# Patient Record
Sex: FEMALE | Race: WHITE | NOT HISPANIC OR LATINO | URBAN - METROPOLITAN AREA
[De-identification: names, ages, dates, MRNs, and addresses within clinical notes are randomized per-mention and may not be internally consistent; named-entity substitution may affect disease eponyms.]

---

## 2017-06-28 ENCOUNTER — APPOINTMENT (OUTPATIENT)
Dept: WOMENS IMAGING | Facility: HOSPITAL | Age: 42
End: 2017-06-28

## 2017-06-28 PROCEDURE — 77067 SCR MAMMO BI INCL CAD: CPT | Performed by: RADIOLOGY

## 2018-07-10 ENCOUNTER — APPOINTMENT (OUTPATIENT)
Dept: WOMENS IMAGING | Facility: HOSPITAL | Age: 43
End: 2018-07-10

## 2018-07-10 PROCEDURE — 77067 SCR MAMMO BI INCL CAD: CPT | Performed by: RADIOLOGY

## 2019-07-16 ENCOUNTER — APPOINTMENT (OUTPATIENT)
Dept: WOMENS IMAGING | Facility: HOSPITAL | Age: 44
End: 2019-07-16

## 2019-07-16 PROCEDURE — 77067 SCR MAMMO BI INCL CAD: CPT | Performed by: RADIOLOGY

## 2021-07-28 ENCOUNTER — APPOINTMENT (OUTPATIENT)
Dept: WOMENS IMAGING | Facility: HOSPITAL | Age: 46
End: 2021-07-28

## 2021-07-28 PROCEDURE — 77063 BREAST TOMOSYNTHESIS BI: CPT | Performed by: RADIOLOGY

## 2021-07-28 PROCEDURE — 77067 SCR MAMMO BI INCL CAD: CPT | Performed by: RADIOLOGY

## 2024-07-11 ENCOUNTER — OFFICE VISIT (OUTPATIENT)
Dept: NEUROLOGY | Facility: CLINIC | Age: 49
End: 2024-07-11
Payer: COMMERCIAL

## 2024-07-11 VITALS
WEIGHT: 142.4 LBS | DIASTOLIC BLOOD PRESSURE: 78 MMHG | HEIGHT: 62 IN | OXYGEN SATURATION: 99 % | HEART RATE: 77 BPM | BODY MASS INDEX: 26.2 KG/M2 | SYSTOLIC BLOOD PRESSURE: 124 MMHG

## 2024-07-11 DIAGNOSIS — G43.719 INTRACTABLE CHRONIC MIGRAINE WITHOUT AURA AND WITHOUT STATUS MIGRAINOSUS: Primary | ICD-10-CM

## 2024-07-11 PROBLEM — I88.9 LYMPHADENITIS: Status: ACTIVE | Noted: 2024-07-11

## 2024-07-11 PROBLEM — G56.00 CARPAL TUNNEL SYNDROME: Status: ACTIVE | Noted: 2024-07-11

## 2024-07-11 PROBLEM — R73.09 HIGH GLUCOSE LEVEL: Status: ACTIVE | Noted: 2024-07-11

## 2024-07-11 PROBLEM — F41.9 ANXIETY: Status: ACTIVE | Noted: 2024-07-11

## 2024-07-11 PROCEDURE — 99204 OFFICE O/P NEW MOD 45 MIN: CPT | Performed by: NURSE PRACTITIONER

## 2024-07-11 RX ORDER — PROPRANOLOL HYDROCHLORIDE 20 MG/1
20 TABLET ORAL 2 TIMES DAILY
Qty: 60 TABLET | Refills: 5 | Status: SHIPPED | OUTPATIENT
Start: 2024-07-11

## 2024-07-11 RX ORDER — LORAZEPAM 0.5 MG/1
0.5 TABLET ORAL AS NEEDED
COMMUNITY
Start: 2024-04-08

## 2024-07-11 RX ORDER — LEVOTHYROXINE SODIUM 88 UG/1
88 TABLET ORAL DAILY
COMMUNITY
Start: 2024-05-07

## 2024-07-11 RX ORDER — RIZATRIPTAN BENZOATE 10 MG/1
10 TABLET, ORALLY DISINTEGRATING ORAL ONCE AS NEEDED
Qty: 9 TABLET | Refills: 5 | Status: SHIPPED | OUTPATIENT
Start: 2024-07-11

## 2024-07-11 RX ORDER — TOPIRAMATE 100 MG/1
100 TABLET, FILM COATED ORAL 2 TIMES DAILY
COMMUNITY
Start: 2024-04-08

## 2024-07-11 RX ORDER — FLUTICASONE PROPIONATE 50 MCG
1 SPRAY, SUSPENSION (ML) NASAL DAILY
COMMUNITY
Start: 2024-04-08

## 2024-07-11 RX ORDER — SACCHAROMYCES BOULARDII 250 MG
250 CAPSULE ORAL 2 TIMES DAILY
COMMUNITY

## 2024-07-11 NOTE — LETTER
2024     Jasvir Gonzalez MD  [address] [place] [address]    Patient: Jeanna Martinez   YOB: 1975   Date of Visit: 2024     Dear Jasvir Gonzalez MD:       Thank you for referring Jeanna Martinez to me for evaluation. Below are the relevant portions of my assessment and plan of care.    If you have questions, please do not hesitate to call me. I look forward to following Jeanna along with you.         Sincerely,        Kin Garcia DNP, APRN        CC: No Recipients    Kin Garcia DNP, APRN  [phone]  Signed     Neuro Office Visit      Encounter Date: 2024   Patient Name: Jeanna Martinez  : 1975   MRN: [id number]   PCP: Dr Gonzalez  Chief Complaint:    Chief Complaint   Patient presents with   • Migraine       History of Present Illness: Jeanna Martinez is a 48 y.o. female who is here today in Neurology for  migraines.    History of Present Illness      The patient presents for evaluation of migraines. She is accompanied by her .    The patient has been experiencing persistent migraines since the age of 14 or 15, which have been progressively worsening. She experiences approximately 12 migraines per month, in addition to daily headaches, which do not interfere with her daily activities. Her mild headaches, which intensify into a migraine, typically lasting for 2 days. The severity of her severe headaches is described as sharp and throbbing, localized around the eye sockets, and around her head in a band distribution.  Her mild headaches, which she rates as 3 out of 10, are not severe enough to prevent her from working. A few years ago, she received an injection in the emergency room.     Her migraines are triggered by menstrual cycle, allergies, tension, and stress. She was prescribed Slynd by Dr. Gonzalez, which has not provided relief. She maintains adequate hydration, including water, 1 to 2 cups of coffee, and tea. She does not smoke or drink  alcohol. Her migraines are triggered by skipping meals, dehydration, and weather changes.     She experiences nausea, vomiting, photophobia, and phonophobia with her migraines. She denies experiencing dizziness or tinnitus. She reports vision changes during her migraines, including blurry vision and difficulty focusing. Her last eye exam was approximately 18 months ago. S    She has tried Topamax. Her migraines have increased in frequency since 2018. She snores at night, but has not been evaluated for sleep apnea. She denies a history of kidney stones. She experienced tingling sensations when on topiramate, which resolved after increasing her water intake. She denies experiencing brain fog. She is nervous about injections due to potential side effects.            Migraines    Headache Symptoms:   Days per month: daily 12 are severe.  Location: Right Eye, Left Eye, and band distribution. Uses conterpressure and coolness      Quality: Sharp and Throbbing        Duration: 2 days  Severity: mild 3/10. Migraine. 7/10  Triggers: menstrual cycles, stress.  Disrupted sleep, skipped meals and weather changes  Associated Symptoms: Nausea, Vomiting, Photophobia, Phonophobia, and  Vision changes blurred.  Aura: none  Hydration: 64 oz  Sleep: 7 hours  Last eye exam: 18 months ago  Birth control: started slynd 20 months.  Snores at night: this is not new.      Abortives: excedrin migraine 4/week,  Preventives: TPM,        IMAGING SCANNED (06/05/2018)-MRI brain-nml                                                                  PMH: hypothyroidism, insomnia, environmental allergies. No renal stones. CTS using stand up desk with good results. Wore splints for a time.  FH: mother, dtr, mother w brain aneurysm  SH: , works as state employee, 2 kids, 2 cats and dogs. -tob, -etoh, -drugs.  Subjective      Past Medical History:   Past Medical History:   Diagnosis Date   • Migraines        Past Surgical History:   Past Surgical  History:   Procedure Laterality Date   •  SECTION         Family History:   Family History   Problem Relation Age of Onset   • Aneurysm Mother    • Migraine headaches Mother    • Cancer Father    • Diabetes Maternal Grandmother    • Aneurysm Maternal Aunt    • Aneurysm Paternal Aunt    • Migraine headaches Daughter        Social History:   Social History     Socioeconomic History   • Marital status:    Tobacco Use   • Smoking status: Never   • Smokeless tobacco: Never   Vaping Use   • Vaping status: Never Used   Substance and Sexual Activity   • Alcohol use: Not Currently   • Drug use: Never   • Sexual activity: Yes       Medications:     Current Outpatient Medications:   •  fluticasone (FLONASE) 50 MCG/ACT nasal spray, 1 spray into the nostril(s) as directed by provider Daily., Disp: , Rfl:   •  levothyroxine (SYNTHROID, LEVOTHROID) 88 MCG tablet, Take 1 tablet by mouth Daily., Disp: , Rfl:   •  LORazepam (ATIVAN) 0.5 MG tablet, Take 1 tablet by mouth As Needed., Disp: , Rfl:   •  saccharomyces boulardii (FLORASTOR) 250 MG capsule, Take 1 capsule by mouth 2 (Two) Times a Day., Disp: , Rfl:   •  topiramate (TOPAMAX) 100 MG tablet, Take 1 tablet by mouth 2 (Two) Times a Day., Disp: , Rfl:   •  propranolol (INDERAL) 20 MG tablet, Take 1 tablet by mouth 2 (Two) Times a Day., Disp: 60 tablet, Rfl: 5  •  rizatriptan MLT (Maxalt-MLT) 10 MG disintegrating tablet, Place 1 tablet on the tongue 1 (One) Time As Needed for Migraine for up to 1 dose. May repeat in 2 hours if needed, Disp: 9 tablet, Rfl: 5    Allergies:   Allergies   Allergen Reactions   • Cephalexin Other (See Comments)   • Sulfa Antibiotics Other (See Comments)       PHQ-9 Total Score:     STEADI Fall Risk Assessment has not been completed.    Objective     Physical Exam:   Physical Exam  Neurological:      Mental Status: She is oriented to person, place, and time.      Coordination: Finger-Nose-Finger Test, Heel to Shin Test and Romberg  Test normal.      Gait: Gait is intact.      Deep Tendon Reflexes:      Reflex Scores:       Tricep reflexes are 2+ on the right side and 2+ on the left side.       Bicep reflexes are 2+ on the right side and 2+ on the left side.       Brachioradialis reflexes are 2+ on the right side and 2+ on the left side.       Patellar reflexes are 2+ on the right side and 2+ on the left side.       Achilles reflexes are 2+ on the right side and 2+ on the left side.  Psychiatric:         Speech: Speech normal.         Neurologic Exam     Mental Status   Oriented to person, place, and time.   Follows 3 step commands.   Attention: normal. Concentration: normal.   Speech: speech is normal   Level of consciousness: alert  Knowledge: consistent with education.   Normal comprehension.     Cranial Nerves     CN III, IV, VI   CN III: no CN III palsy  CN VI: no CN VI palsy  Nystagmus: none   Diplopia: none  Upgaze: normal  Downgaze: normal  Conjugate gaze: present    CN VII   Facial expression full, symmetric.     CN VIII   Hearing: intact    CN XII   CN XII normal.     Motor Exam   Muscle bulk: normal  Overall muscle tone: normal    Strength   Right biceps: 5/5  Left biceps: 5/5  Right triceps: 5/5  Left triceps: 5/5  Right interossei: 5/5  Left interossei: 5/5  Right quadriceps: 5/5  Left quadriceps: 5/5  Right anterior tibial: 5/5  Left anterior tibial: 5/5  Right posterior tibial: 5/5  Left posterior tibial: 5/5    Sensory Exam   Light touch normal.     Gait, Coordination, and Reflexes     Gait  Gait: normal    Coordination   Romberg: negative  Finger to nose coordination: normal  Heel to shin coordination: normal    Tremor   Resting tremor: absent  Action tremor: absent    Reflexes   Right brachioradialis: 2+  Left brachioradialis: 2+  Right biceps: 2+  Left biceps: 2+  Right triceps: 2+  Left triceps: 2+  Right patellar: 2+  Left patellar: 2+  Right achilles: 2+  Left achilles: 2+  Right : 2+  Left : 2+     Physical  "Exam        Vital Signs:   Vitals:    07/11/24 0950   BP: 124/78   Pulse: 77   SpO2: 99%   Weight: 64.6 kg (142 lb 6.4 oz)   Height: 157.5 cm (62\")     Body mass index is 26.05 kg/m².         Assessment / Plan      Assessment/Plan:   Diagnoses and all orders for this visit:    1. Intractable chronic migraine without aura and without status migrainosus (Primary)  Comments:  Start propranolol and maxalt  Orders:  -     propranolol (INDERAL) 20 MG tablet; Take 1 tablet by mouth 2 (Two) Times a Day.  Dispense: 60 tablet; Refill: 5  -     rizatriptan MLT (Maxalt-MLT) 10 MG disintegrating tablet; Place 1 tablet on the tongue 1 (One) Time As Needed for Migraine for up to 1 dose. May repeat in 2 hours if needed  Dispense: 9 tablet; Refill: 5       Assessment & Plan  1. Migraine headaches.  A prescription for propranolol 20 mg, to be taken twice daily for a duration of 4 to 6 weeks, was issued. The patient was also advised to discontinue Excedrin Migraine and to maintain a migraine journal.    Follow-up  A follow-up appointment is scheduled for 12 weeks from now.        Patient Education:       Reviewed medications, potential side effects and signs and symptoms to report. Discussed risk versus benefits of treatment plan with patient and/or family-including medications, labs and radiology that may be ordered. Addressed questions and concerns during visit. Patient and/or family verbalized understanding and agree with plan. Instructed to call the office with any questions and report to ER with any life-threatening symptoms.     Follow Up:   Return in about 3 months (around 10/11/2024) for Recheck.    During this visit the following were done:  Labs Reviewed []    Labs Ordered []    Radiology Reports Reviewed []    Radiology Ordered []    PCP Records Reviewed []    Referring Provider Records Reviewed []    ER Records Reviewed []    Hospital Records Reviewed []    History Obtained From Family []    Radiology Images Reviewed []  "   Other Reviewed []    Records Requested []      Patient or patient representative verbalized consent for the use of Ambient Listening during the visit with  Kin Garcia DNP, APRN for chart documentation. 7/11/2024  08:29 EDT      Kin Garcia DNP, APRN

## 2024-07-11 NOTE — PROGRESS NOTES
Neuro Office Visit      Encounter Date: 2024   Patient Name: Jeanna Martinez  : 1975   MRN: [id number]   PCP: Dr Gonzalez  Chief Complaint:    Chief Complaint   Patient presents with    Migraine       History of Present Illness: Jeanna Martinez is a 48 y.o. female who is here today in Neurology for  migraines.    History of Present Illness      The patient presents for evaluation of migraines. She is accompanied by her .    The patient has been experiencing persistent migraines since the age of 14 or 15, which have been progressively worsening. She experiences approximately 12 migraines per month, in addition to daily headaches, which do not interfere with her daily activities. Her mild headaches, which intensify into a migraine, typically lasting for 2 days. The severity of her severe headaches is described as sharp and throbbing, localized around the eye sockets, and around her head in a band distribution.  Her mild headaches, which she rates as 3 out of 10, are not severe enough to prevent her from working. A few years ago, she received an injection in the emergency room.     Her migraines are triggered by menstrual cycle, allergies, tension, and stress. She was prescribed Slynd by Dr. Gonzalez, which has not provided relief. She maintains adequate hydration, including water, 1 to 2 cups of coffee, and tea. She does not smoke or drink alcohol. Her migraines are triggered by skipping meals, dehydration, and weather changes.     She experiences nausea, vomiting, photophobia, and phonophobia with her migraines. She denies experiencing dizziness or tinnitus. She reports vision changes during her migraines, including blurry vision and difficulty focusing. Her last eye exam was approximately 18 months ago. S    She has tried Topamax. Her migraines have increased in frequency since 2018. She snores at night, but has not been evaluated for sleep apnea. She denies a history of kidney stones. She  experienced tingling sensations when on topiramate, which resolved after increasing her water intake. She denies experiencing brain fog. She is nervous about injections due to potential side effects.            Migraines    Headache Symptoms:   Days per month: daily 12 are severe.  Location: Right Eye, Left Eye, and band distribution. Uses conterpressure and coolness      Quality: Sharp and Throbbing        Duration: 2 days  Severity: mild 3/10. Migraine. 10  Triggers: menstrual cycles, stress.  Disrupted sleep, skipped meals and weather changes  Associated Symptoms: Nausea, Vomiting, Photophobia, Phonophobia, and  Vision changes blurred.  Aura: none  Hydration: 64 oz  Sleep: 7 hours  Last eye exam: 18 months ago  Birth control: started slynd 20 months.  Snores at night: this is not new.      Abortives: excedrin migraine 4/week,  Preventives: TPM,        IMAGING SCANNED (2018)-MRI brain-nml                                                                  PMH: hypothyroidism, insomnia, environmental allergies. No renal stones. CTS using stand up desk with good results. Wore splints for a time.  FH: mother, dtr, mother w brain aneurysm  SH: , works as state employee, 2 kids, 2 cats and dogs. -tob, -etoh, -drugs.  Subjective      Past Medical History:   Past Medical History:   Diagnosis Date    Migraines        Past Surgical History:   Past Surgical History:   Procedure Laterality Date     SECTION         Family History:   Family History   Problem Relation Age of Onset    Aneurysm Mother     Migraine headaches Mother     Cancer Father     Diabetes Maternal Grandmother     Aneurysm Maternal Aunt     Aneurysm Paternal Aunt     Migraine headaches Daughter        Social History:   Social History     Socioeconomic History    Marital status:    Tobacco Use    Smoking status: Never    Smokeless tobacco: Never   Vaping Use    Vaping status: Never Used   Substance and Sexual Activity     Alcohol use: Not Currently    Drug use: Never    Sexual activity: Yes       Medications:     Current Outpatient Medications:     fluticasone (FLONASE) 50 MCG/ACT nasal spray, 1 spray into the nostril(s) as directed by provider Daily., Disp: , Rfl:     levothyroxine (SYNTHROID, LEVOTHROID) 88 MCG tablet, Take 1 tablet by mouth Daily., Disp: , Rfl:     LORazepam (ATIVAN) 0.5 MG tablet, Take 1 tablet by mouth As Needed., Disp: , Rfl:     saccharomyces boulardii (FLORASTOR) 250 MG capsule, Take 1 capsule by mouth 2 (Two) Times a Day., Disp: , Rfl:     topiramate (TOPAMAX) 100 MG tablet, Take 1 tablet by mouth 2 (Two) Times a Day., Disp: , Rfl:     propranolol (INDERAL) 20 MG tablet, Take 1 tablet by mouth 2 (Two) Times a Day., Disp: 60 tablet, Rfl: 5    rizatriptan MLT (Maxalt-MLT) 10 MG disintegrating tablet, Place 1 tablet on the tongue 1 (One) Time As Needed for Migraine for up to 1 dose. May repeat in 2 hours if needed, Disp: 9 tablet, Rfl: 5    Allergies:   Allergies   Allergen Reactions    Cephalexin Other (See Comments)    Sulfa Antibiotics Other (See Comments)       PHQ-9 Total Score:     STEADI Fall Risk Assessment has not been completed.    Objective     Physical Exam:   Physical Exam  Neurological:      Mental Status: She is oriented to person, place, and time.      Coordination: Finger-Nose-Finger Test, Heel to Shin Test and Romberg Test normal.      Gait: Gait is intact.      Deep Tendon Reflexes:      Reflex Scores:       Tricep reflexes are 2+ on the right side and 2+ on the left side.       Bicep reflexes are 2+ on the right side and 2+ on the left side.       Brachioradialis reflexes are 2+ on the right side and 2+ on the left side.       Patellar reflexes are 2+ on the right side and 2+ on the left side.       Achilles reflexes are 2+ on the right side and 2+ on the left side.  Psychiatric:         Speech: Speech normal.         Neurologic Exam     Mental Status   Oriented to person, place, and time.  "  Follows 3 step commands.   Attention: normal. Concentration: normal.   Speech: speech is normal   Level of consciousness: alert  Knowledge: consistent with education.   Normal comprehension.     Cranial Nerves     CN III, IV, VI   CN III: no CN III palsy  CN VI: no CN VI palsy  Nystagmus: none   Diplopia: none  Upgaze: normal  Downgaze: normal  Conjugate gaze: present    CN VII   Facial expression full, symmetric.     CN VIII   Hearing: intact    CN XII   CN XII normal.     Motor Exam   Muscle bulk: normal  Overall muscle tone: normal    Strength   Right biceps: 5/5  Left biceps: 5/5  Right triceps: 5/5  Left triceps: 5/5  Right interossei: 5/5  Left interossei: 5/5  Right quadriceps: 5/5  Left quadriceps: 5/5  Right anterior tibial: 5/5  Left anterior tibial: 5/5  Right posterior tibial: 5/5  Left posterior tibial: 5/5    Sensory Exam   Light touch normal.     Gait, Coordination, and Reflexes     Gait  Gait: normal    Coordination   Romberg: negative  Finger to nose coordination: normal  Heel to shin coordination: normal    Tremor   Resting tremor: absent  Action tremor: absent    Reflexes   Right brachioradialis: 2+  Left brachioradialis: 2+  Right biceps: 2+  Left biceps: 2+  Right triceps: 2+  Left triceps: 2+  Right patellar: 2+  Left patellar: 2+  Right achilles: 2+  Left achilles: 2+  Right : 2+  Left : 2+     Physical Exam        Vital Signs:   Vitals:    07/11/24 0950   BP: 124/78   Pulse: 77   SpO2: 99%   Weight: 64.6 kg (142 lb 6.4 oz)   Height: 157.5 cm (62\")     Body mass index is 26.05 kg/m².         Assessment / Plan      Assessment/Plan:   Diagnoses and all orders for this visit:    1. Intractable chronic migraine without aura and without status migrainosus (Primary)  Comments:  Start propranolol and maxalt  Orders:  -     propranolol (INDERAL) 20 MG tablet; Take 1 tablet by mouth 2 (Two) Times a Day.  Dispense: 60 tablet; Refill: 5  -     rizatriptan MLT (Maxalt-MLT) 10 MG disintegrating " tablet; Place 1 tablet on the tongue 1 (One) Time As Needed for Migraine for up to 1 dose. May repeat in 2 hours if needed  Dispense: 9 tablet; Refill: 5       Assessment & Plan  1. Migraine headaches.  A prescription for propranolol 20 mg, to be taken twice daily for a duration of 4 to 6 weeks, was issued. The patient was also advised to discontinue Excedrin Migraine and to maintain a migraine journal.    Follow-up  A follow-up appointment is scheduled for 12 weeks from now.        Patient Education:       Reviewed medications, potential side effects and signs and symptoms to report. Discussed risk versus benefits of treatment plan with patient and/or family-including medications, labs and radiology that may be ordered. Addressed questions and concerns during visit. Patient and/or family verbalized understanding and agree with plan. Instructed to call the office with any questions and report to ER with any life-threatening symptoms.     Follow Up:   Return in about 3 months (around 10/11/2024) for Recheck.    During this visit the following were done:  Labs Reviewed []    Labs Ordered []    Radiology Reports Reviewed []    Radiology Ordered []    PCP Records Reviewed []    Referring Provider Records Reviewed []    ER Records Reviewed []    Hospital Records Reviewed []    History Obtained From Family []    Radiology Images Reviewed []    Other Reviewed []    Records Requested []      Patient or patient representative verbalized consent for the use of Ambient Listening during the visit with  Kin Garcia DNP, APRZOILA for chart documentation. 7/11/2024  08:29 EDT      Kin Garcia DNP, APRN

## 2024-10-10 ENCOUNTER — OFFICE VISIT (OUTPATIENT)
Dept: NEUROLOGY | Facility: CLINIC | Age: 49
End: 2024-10-10
Payer: COMMERCIAL

## 2024-10-10 VITALS
BODY MASS INDEX: 26.68 KG/M2 | HEART RATE: 64 BPM | WEIGHT: 145 LBS | HEIGHT: 62 IN | OXYGEN SATURATION: 100 % | SYSTOLIC BLOOD PRESSURE: 116 MMHG | DIASTOLIC BLOOD PRESSURE: 82 MMHG

## 2024-10-10 DIAGNOSIS — G43.719 INTRACTABLE CHRONIC MIGRAINE WITHOUT AURA AND WITHOUT STATUS MIGRAINOSUS: Primary | ICD-10-CM

## 2024-10-10 DIAGNOSIS — G43.719 INTRACTABLE CHRONIC MIGRAINE WITHOUT AURA AND WITHOUT STATUS MIGRAINOSUS: ICD-10-CM

## 2024-10-10 PROCEDURE — 99213 OFFICE O/P EST LOW 20 MIN: CPT | Performed by: NURSE PRACTITIONER

## 2024-10-10 RX ORDER — DROSPIRENONE 4 MG/1
4 TABLET, FILM COATED ORAL DAILY
COMMUNITY
Start: 2024-08-05

## 2024-10-10 RX ORDER — CETIRIZINE HYDROCHLORIDE 5 MG/1
5 TABLET ORAL DAILY
COMMUNITY

## 2024-10-10 RX ORDER — TOPIRAMATE 100 MG/1
100 TABLET, FILM COATED ORAL 2 TIMES DAILY
Qty: 60 TABLET | Refills: 11 | Status: SHIPPED | OUTPATIENT
Start: 2024-10-10

## 2024-10-10 RX ORDER — FEXOFENADINE HCL 60 MG/1
60 TABLET, FILM COATED ORAL 2 TIMES DAILY
COMMUNITY

## 2024-10-10 RX ORDER — PROPRANOLOL HCL 20 MG
20 TABLET ORAL 2 TIMES DAILY
Qty: 60 TABLET | Refills: 11 | Status: SHIPPED | OUTPATIENT
Start: 2024-10-10

## 2024-10-10 NOTE — LETTER
October 10, 2024     Jasvir Gonzalez MD  [address] [place] [address]    Patient: Jeanna Martinez   YOB: 1975   Date of Visit: 10/10/2024     Dear Jasvir Gonzalez MD:       Thank you for referring Jeanna Martinez to me for evaluation. Below are the relevant portions of my assessment and plan of care.    If you have questions, please do not hesitate to call me. I look forward to following Jeanna along with you.         Sincerely,        Kin Garcia DNP, APRN        CC: No Recipients    Kin Garcia DNP, APRN  10/10/24 1115  Signed     Neuro Office Visit      Encounter Date: 10/10/2024   Patient Name: Jeanna Martinez  : 1975   MRN: [id number]   PCP: Jasvir Gonzalez MD  Chief Complaint:    Chief Complaint   Patient presents with   • Migraine       History of Present Illness: Jeanna Martinez is a 48 y.o. female who is here today in Neurology for  migraine      Last visit 2024 w me-start propranolol, maxalt. Stop excedrin., keep journal  History of Present Illness  The patient presents for evaluation of headaches. She is accompanied by an adult male.    She has been maintaining a headache diary as previously advised. She experienced two severe migraines, one in 2024 and another this month, both accompanied by vomiting and diarrhea. Regular headaches are also a part of her symptoms. She describes her migraines as intense, often confining her to the couch and necessitating frequent bathroom visits. She experiences pain spots and soreness, which she believes may be related to her migraines. This soreness prevents her from tying her hair up. She has noticed a specific area on the right side of her head that is particularly sensitive.    Rizatriptan has been her primary medication, which she reports to be effective most of the time, but occasionally fails to provide relief. She has not used Excedrin Migraine. Initially, she experienced withdrawal headaches, but these have since  improved. She finds that Tylenol provides relief and takes it promptly when she senses a headache coming on. She also reports dizziness after taking rizatriptan, which makes her cautious about driving or working after taking the medication.    She is currently on propranolol and topiramate. She reports feeling fatigued, which she attributes to the propranolol. This fatigue was more pronounced during the first 2 to 3 weeks of treatment, making her walks more challenging, but she notes some improvement over time. She has run out of propranolol as of this afternoon. Her current medications include Topamax 100 mg twice a day and propranolol 20 mg twice a day.    She recently lost her father, which has affected her sleep. She prefers to manage her condition with minimal medication and maintains an active lifestyle, swimming close to 50 hours a week. She sleeps 8 to 9 hours a night and falls asleep easily.        Migraines   Current rx: maxlat, TPM, propranolol  Headache Symptoms:   Days per month: daily 12 are severe.  Location: Right Eye, Left Eye, and band distribution. Uses conterpressure and coolness      Quality: Sharp and Throbbing        Duration: 2 days  Severity: mild 3/10. Migraine. 7/10  Triggers: menstrual cycles, stress.  Disrupted sleep, skipped meals and weather changes  Associated Symptoms: Nausea, Vomiting, Photophobia, Phonophobia, and  Vision changes blurred.  Aura: none  Hydration: 64 oz  Sleep: 7 hours  Last eye exam: 18 months ago  Birth control: started slynd 20 months.  Snores at night: this is not new.        Abortives: excedrin migraine 4/week, rizatriptan  Preventives: TPM,        IMAGING SCANNED (06/05/2018)-MRI brain-nml      The patient has been experiencing persistent migraines since the age of 14 or 15, which have been progressively worsening. She experiences approximately 12 migraines per month, in addition to daily headaches, which do not interfere with her daily activities. Her mild  headaches, which intensify into a migraine, typically lasting for 2 days. The severity of her severe headaches is described as sharp and throbbing, localized around the eye sockets, and around her head in a band distribution.  Her mild headaches, which she rates as 3 out of 10, are not severe enough to prevent her from working. A few years ago, she received an injection in the emergency room.      Her migraines are triggered by menstrual cycle, allergies, tension, and stress. She was prescribed Slynd by Dr. Gonzalez, which has not provided relief. She maintains adequate hydration, including water, 1 to 2 cups of coffee, and tea. She does not smoke or drink alcohol. Her migraines are triggered by skipping meals, dehydration, and weather changes.      She experiences nausea, vomiting, photophobia, and phonophobia with her migraines. She denies experiencing dizziness or tinnitus. She reports vision changes during her migraines, including blurry vision and difficulty focusing. Her last eye exam was approximately 18 months ago. S     She has tried Topamax. Her migraines have increased in frequency since 2018. She snores at night, but has not been evaluated for sleep apnea. She denies a history of kidney stones. She experienced tingling sensations when on topiramate, which resolved after increasing her water intake. She denies experiencing brain fog. She is nervous about injections due to potential side effects.                                                                    PMH: hypothyroidism, insomnia, environmental allergies. No renal stones. CTS using stand up desk with good results. Wore splints for a time.  FH: mother, dtr, mother w brain aneurysm  SH: , works as state employee, 2 kids, 2 cats and dogs. -tob, -etoh, -drugs.          Subjective      Past Medical History:   Past Medical History:   Diagnosis Date   • Migraines        Past Surgical History:   Past Surgical History:   Procedure Laterality Date    •  SECTION         Family History:   Family History   Problem Relation Age of Onset   • Aneurysm Mother    • Migraine headaches Mother    • Cancer Father    • Diabetes Maternal Grandmother    • Aneurysm Maternal Aunt    • Aneurysm Paternal Aunt    • Migraine headaches Daughter        Social History:   Social History     Socioeconomic History   • Marital status:    Tobacco Use   • Smoking status: Never   • Smokeless tobacco: Never   Vaping Use   • Vaping status: Never Used   Substance and Sexual Activity   • Alcohol use: Not Currently   • Drug use: Never   • Sexual activity: Yes       Medications:     Current Outpatient Medications:   •  cetirizine (zyrTEC) 5 MG tablet, Take 1 tablet by mouth Daily., Disp: , Rfl:   •  fexofenadine (Allegra Allergy) 60 MG tablet, Take 1 tablet by mouth 2 (Two) Times a Day., Disp: , Rfl:   •  fluticasone (FLONASE) 50 MCG/ACT nasal spray, Administer 1 spray into the nostril(s) as directed by provider Daily. 2 sprays in each nostril daily., Disp: , Rfl:   •  levothyroxine (SYNTHROID, LEVOTHROID) 88 MCG tablet, Take 1 tablet by mouth Daily., Disp: , Rfl:   •  LORazepam (ATIVAN) 0.5 MG tablet, Take 1 tablet by mouth As Needed., Disp: , Rfl:   •  propranolol (INDERAL) 20 MG tablet, Take 1 tablet by mouth 2 (Two) Times a Day., Disp: 60 tablet, Rfl: 11  •  rizatriptan MLT (Maxalt-MLT) 10 MG disintegrating tablet, Place 1 tablet on the tongue 1 (One) Time As Needed for Migraine for up to 1 dose. May repeat in 2 hours if needed, Disp: 9 tablet, Rfl: 5  •  Slynd 4 MG tablet, Take 1 tablet by mouth Daily., Disp: , Rfl:   •  topiramate (TOPAMAX) 100 MG tablet, Take 1 tablet by mouth 2 (Two) Times a Day., Disp: 60 tablet, Rfl: 11  •  Rimegepant Sulfate (NURTEC) 75 MG tablet dispersible tablet, Take 1 tablet by mouth Daily As Needed (HA)., Disp: 4 tablet, Rfl: 0  •  ubrogepant (Ubrelvy) 100 MG tablet, Take 1 tablet by mouth 1 (One) Time As Needed (ha)., Disp: 2 tablet, Rfl:  "0    Allergies:   Allergies   Allergen Reactions   • Cephalexin Other (See Comments)   • Sulfa Antibiotics Other (See Comments)       PHQ-9 Total Score:     STEADI Fall Risk Assessment has not been completed.    Objective     Physical Exam:   Physical Exam  Eyes:      Extraocular Movements: No nystagmus.   Neurological:      Motor: Motor strength is normal.     Coordination: Coordination is intact.   Psychiatric:         Speech: Speech normal.         Neurological Exam  Mental Status  Awake, alert and oriented to person, place and time. Recent and remote memory are intact. Speech is normal. Follows complex commands. Attention and concentration are normal. Fund of knowledge is appropriate for level of education.    Cranial Nerves  CN III, IV, VI: No nystagmus. Normal saccades. Normal smooth pursuit.   Right pupil: Round.   Left pupil: Round.  CN V: Facial sensation is normal.  CN VII: Full and symmetric facial movement.    Motor  Normal muscle bulk throughout. No fasciculations present. Normal muscle tone. No abnormal involuntary movements. Strength is 5/5 throughout all four extremities.    Sensory  Sensation is intact to light touch, pinprick, vibration and proprioception in all four extremities.    Coordination    Finger-to-nose, rapid alternating movements and heel-to-shin normal bilaterally without dysmetria.    Gait  Casual gait is normal including stance, stride, and arm swing.     Physical Exam        Vital Signs:   Vitals:    10/10/24 1019   BP: 116/82   Pulse: 64   SpO2: 100%   Weight: 65.8 kg (145 lb)   Height: 157.5 cm (62.01\")     Body mass index is 26.51 kg/m².         Assessment / Plan      Assessment/Plan:   Diagnoses and all orders for this visit:    1. Intractable chronic migraine without aura and without status migrainosus (Primary)  -     Rimegepant Sulfate (NURTEC) 75 MG tablet dispersible tablet; Take 1 tablet by mouth Daily As Needed (HA).  Dispense: 4 tablet; Refill: 0  -     ubrogepant " (Ubrelvy) 100 MG tablet; Take 1 tablet by mouth 1 (One) Time As Needed (ha).  Dispense: 2 tablet; Refill: 0  -     topiramate (TOPAMAX) 100 MG tablet; Take 1 tablet by mouth 2 (Two) Times a Day.  Dispense: 60 tablet; Refill: 11  -     propranolol (INDERAL) 20 MG tablet; Take 1 tablet by mouth 2 (Two) Times a Day.  Dispense: 60 tablet; Refill: 11    2. Intractable chronic migraine without aura and without status migrainosus  Comments:  Start propranolol and maxalt  Orders:  -     Rimegepant Sulfate (NURTEC) 75 MG tablet dispersible tablet; Take 1 tablet by mouth Daily As Needed (HA).  Dispense: 4 tablet; Refill: 0  -     ubrogepant (Ubrelvy) 100 MG tablet; Take 1 tablet by mouth 1 (One) Time As Needed (ha).  Dispense: 2 tablet; Refill: 0  -     topiramate (TOPAMAX) 100 MG tablet; Take 1 tablet by mouth 2 (Two) Times a Day.  Dispense: 60 tablet; Refill: 11  -     propranolol (INDERAL) 20 MG tablet; Take 1 tablet by mouth 2 (Two) Times a Day.  Dispense: 60 tablet; Refill: 11       Assessment & Plan  1. Headaches.  She reports experiencing two severe migraines with vomiting and diarrhea, one in July and one this month. She has been using rizatriptan for her migraines, which works most of the time but occasionally causes dizziness and tiredness. She has stopped taking Excedrin Migraine and noticed an improvement in her milder headaches, although she experienced withdrawal headaches initially. She is currently taking propranolol and topiramate, which have helped reduce the frequency of both severe and mild headaches. Her blood pressure and heart rate are stable but slightly lower than in July. She experiences soreness on the right side of her head, likely due to irritated nerves on the outside of the skull, which can be managed with topiramate. She will continue with her current medications, Topamax 100 mg twice a day and propranolol 20 mg twice a day. Samples of Nurtec will be provided as an alternative abortive  medication, which can be taken at the onset of a headache and repeated after 2 hours if needed. If her current medications are not effective, she can contact the office to discuss trying a new medication. The possibility of adding a tricyclic antidepressant to her regimen was discussed, but she prefers to wait and see if her current treatment continues to improve her condition.    2. Medication Management.  A refill for propranolol will be sent to her pharmacy as she is out of it as of this afternoon.    Follow-up  Return in 6 months for follow up.    Consider elavil or cgrp      Patient Education:       Reviewed medications, potential side effects and signs and symptoms to report. Discussed risk versus benefits of treatment plan with patient and/or family-including medications, labs and radiology that may be ordered. Addressed questions and concerns during visit. Patient and/or family verbalized understanding and agree with plan. Instructed to call the office with any questions and report to ER with any life-threatening symptoms.     Follow Up:   Return in about 6 months (around 4/10/2025) for Recheck.    During this visit the following were done:  Labs Reviewed []    Labs Ordered []    Radiology Reports Reviewed []    Radiology Ordered []    PCP Records Reviewed []    Referring Provider Records Reviewed []    ER Records Reviewed []    Hospital Records Reviewed []    History Obtained From Family [x]    Radiology Images Reviewed []    Other Reviewed [x]    Records Requested []      Patient or patient representative verbalized consent for the use of Ambient Listening during the visit with  Kin Garcia DNP, APRZOILA for chart documentation. 10/10/2024  08:20 EDT      Kin Garcia DNP, APRN

## 2024-10-10 NOTE — PROGRESS NOTES
Neuro Office Visit      Encounter Date: 10/10/2024   Patient Name: Jeanna Martinez  : 1975   MRN: [id number]   PCP: Jasvir Gonzalez MD  Chief Complaint:    Chief Complaint   Patient presents with    Migraine       History of Present Illness: Jeanna Martinez is a 48 y.o. female who is here today in Neurology for  migraine      Last visit 2024 w me-start propranolol, maxalt. Stop excedrin., keep journal  History of Present Illness  The patient presents for evaluation of headaches. She is accompanied by an adult male.    She has been maintaining a headache diary as previously advised. She experienced two severe migraines, one in 2024 and another this month, both accompanied by vomiting and diarrhea. Regular headaches are also a part of her symptoms. She describes her migraines as intense, often confining her to the couch and necessitating frequent bathroom visits. She experiences pain spots and soreness, which she believes may be related to her migraines. This soreness prevents her from tying her hair up. She has noticed a specific area on the right side of her head that is particularly sensitive.    Rizatriptan has been her primary medication, which she reports to be effective most of the time, but occasionally fails to provide relief. She has not used Excedrin Migraine. Initially, she experienced withdrawal headaches, but these have since improved. She finds that Tylenol provides relief and takes it promptly when she senses a headache coming on. She also reports dizziness after taking rizatriptan, which makes her cautious about driving or working after taking the medication.    She is currently on propranolol and topiramate. She reports feeling fatigued, which she attributes to the propranolol. This fatigue was more pronounced during the first 2 to 3 weeks of treatment, making her walks more challenging, but she notes some improvement over time. She has run out of propranolol as of this afternoon. Her  current medications include Topamax 100 mg twice a day and propranolol 20 mg twice a day.    She recently lost her father, which has affected her sleep. She prefers to manage her condition with minimal medication and maintains an active lifestyle, swimming close to 50 hours a week. She sleeps 8 to 9 hours a night and falls asleep easily.        Migraines   Current rx: maxlat, TPM, propranolol  Headache Symptoms:   Days per month: daily 12 are severe.  Location: Right Eye, Left Eye, and band distribution. Uses conterpressure and coolness      Quality: Sharp and Throbbing        Duration: 2 days  Severity: mild 3/10. Migraine. 7/10  Triggers: menstrual cycles, stress.  Disrupted sleep, skipped meals and weather changes  Associated Symptoms: Nausea, Vomiting, Photophobia, Phonophobia, and  Vision changes blurred.  Aura: none  Hydration: 64 oz  Sleep: 7 hours  Last eye exam: 18 months ago  Birth control: started slynd 20 months.  Snores at night: this is not new.        Abortives: excedrin migraine 4/week, rizatriptan  Preventives: TPM,        IMAGING SCANNED (06/05/2018)-MRI brain-nml      The patient has been experiencing persistent migraines since the age of 14 or 15, which have been progressively worsening. She experiences approximately 12 migraines per month, in addition to daily headaches, which do not interfere with her daily activities. Her mild headaches, which intensify into a migraine, typically lasting for 2 days. The severity of her severe headaches is described as sharp and throbbing, localized around the eye sockets, and around her head in a band distribution.  Her mild headaches, which she rates as 3 out of 10, are not severe enough to prevent her from working. A few years ago, she received an injection in the emergency room.      Her migraines are triggered by menstrual cycle, allergies, tension, and stress. She was prescribed Slynd by Dr. Gonzalez, which has not provided relief. She maintains adequate  hydration, including water, 1 to 2 cups of coffee, and tea. She does not smoke or drink alcohol. Her migraines are triggered by skipping meals, dehydration, and weather changes.      She experiences nausea, vomiting, photophobia, and phonophobia with her migraines. She denies experiencing dizziness or tinnitus. She reports vision changes during her migraines, including blurry vision and difficulty focusing. Her last eye exam was approximately 18 months ago. S     She has tried Topamax. Her migraines have increased in frequency since 2018. She snores at night, but has not been evaluated for sleep apnea. She denies a history of kidney stones. She experienced tingling sensations when on topiramate, which resolved after increasing her water intake. She denies experiencing brain fog. She is nervous about injections due to potential side effects.                                                                    PMH: hypothyroidism, insomnia, environmental allergies. No renal stones. CTS using stand up desk with good results. Wore splints for a time.  FH: mother, dtr, mother w brain aneurysm  SH: , works as state employee, 2 kids, 2 cats and dogs. -tob, -etoh, -drugs.          Subjective      Past Medical History:   Past Medical History:   Diagnosis Date    Migraines        Past Surgical History:   Past Surgical History:   Procedure Laterality Date     SECTION         Family History:   Family History   Problem Relation Age of Onset    Aneurysm Mother     Migraine headaches Mother     Cancer Father     Diabetes Maternal Grandmother     Aneurysm Maternal Aunt     Aneurysm Paternal Aunt     Migraine headaches Daughter        Social History:   Social History     Socioeconomic History    Marital status:    Tobacco Use    Smoking status: Never    Smokeless tobacco: Never   Vaping Use    Vaping status: Never Used   Substance and Sexual Activity    Alcohol use: Not Currently    Drug use: Never    Sexual  activity: Yes       Medications:     Current Outpatient Medications:     cetirizine (zyrTEC) 5 MG tablet, Take 1 tablet by mouth Daily., Disp: , Rfl:     fexofenadine (Allegra Allergy) 60 MG tablet, Take 1 tablet by mouth 2 (Two) Times a Day., Disp: , Rfl:     fluticasone (FLONASE) 50 MCG/ACT nasal spray, Administer 1 spray into the nostril(s) as directed by provider Daily. 2 sprays in each nostril daily., Disp: , Rfl:     levothyroxine (SYNTHROID, LEVOTHROID) 88 MCG tablet, Take 1 tablet by mouth Daily., Disp: , Rfl:     LORazepam (ATIVAN) 0.5 MG tablet, Take 1 tablet by mouth As Needed., Disp: , Rfl:     propranolol (INDERAL) 20 MG tablet, Take 1 tablet by mouth 2 (Two) Times a Day., Disp: 60 tablet, Rfl: 11    rizatriptan MLT (Maxalt-MLT) 10 MG disintegrating tablet, Place 1 tablet on the tongue 1 (One) Time As Needed for Migraine for up to 1 dose. May repeat in 2 hours if needed, Disp: 9 tablet, Rfl: 5    Slynd 4 MG tablet, Take 1 tablet by mouth Daily., Disp: , Rfl:     topiramate (TOPAMAX) 100 MG tablet, Take 1 tablet by mouth 2 (Two) Times a Day., Disp: 60 tablet, Rfl: 11    Rimegepant Sulfate (NURTEC) 75 MG tablet dispersible tablet, Take 1 tablet by mouth Daily As Needed (HA)., Disp: 4 tablet, Rfl: 0    ubrogepant (Ubrelvy) 100 MG tablet, Take 1 tablet by mouth 1 (One) Time As Needed (ha)., Disp: 2 tablet, Rfl: 0    Allergies:   Allergies   Allergen Reactions    Cephalexin Other (See Comments)    Sulfa Antibiotics Other (See Comments)       PHQ-9 Total Score:     STEADI Fall Risk Assessment has not been completed.    Objective     Physical Exam:   Physical Exam  Eyes:      Extraocular Movements: No nystagmus.   Neurological:      Motor: Motor strength is normal.     Coordination: Coordination is intact.   Psychiatric:         Speech: Speech normal.         Neurological Exam  Mental Status  Awake, alert and oriented to person, place and time. Recent and remote memory are intact. Speech is normal. Follows  "complex commands. Attention and concentration are normal. Fund of knowledge is appropriate for level of education.    Cranial Nerves  CN III, IV, VI: No nystagmus. Normal saccades. Normal smooth pursuit.   Right pupil: Round.   Left pupil: Round.  CN V: Facial sensation is normal.  CN VII: Full and symmetric facial movement.    Motor  Normal muscle bulk throughout. No fasciculations present. Normal muscle tone. No abnormal involuntary movements. Strength is 5/5 throughout all four extremities.    Sensory  Sensation is intact to light touch, pinprick, vibration and proprioception in all four extremities.    Coordination    Finger-to-nose, rapid alternating movements and heel-to-shin normal bilaterally without dysmetria.    Gait  Casual gait is normal including stance, stride, and arm swing.     Physical Exam        Vital Signs:   Vitals:    10/10/24 1019   BP: 116/82   Pulse: 64   SpO2: 100%   Weight: 65.8 kg (145 lb)   Height: 157.5 cm (62.01\")     Body mass index is 26.51 kg/m².         Assessment / Plan      Assessment/Plan:   Diagnoses and all orders for this visit:    1. Intractable chronic migraine without aura and without status migrainosus (Primary)  -     Rimegepant Sulfate (NURTEC) 75 MG tablet dispersible tablet; Take 1 tablet by mouth Daily As Needed (HA).  Dispense: 4 tablet; Refill: 0  -     ubrogepant (Ubrelvy) 100 MG tablet; Take 1 tablet by mouth 1 (One) Time As Needed (ha).  Dispense: 2 tablet; Refill: 0  -     topiramate (TOPAMAX) 100 MG tablet; Take 1 tablet by mouth 2 (Two) Times a Day.  Dispense: 60 tablet; Refill: 11  -     propranolol (INDERAL) 20 MG tablet; Take 1 tablet by mouth 2 (Two) Times a Day.  Dispense: 60 tablet; Refill: 11    2. Intractable chronic migraine without aura and without status migrainosus  Comments:  Start propranolol and maxalt  Orders:  -     Rimegepant Sulfate (NURTEC) 75 MG tablet dispersible tablet; Take 1 tablet by mouth Daily As Needed (HA).  Dispense: 4 tablet; " Refill: 0  -     ubrogepant (Ubrelvy) 100 MG tablet; Take 1 tablet by mouth 1 (One) Time As Needed (ha).  Dispense: 2 tablet; Refill: 0  -     topiramate (TOPAMAX) 100 MG tablet; Take 1 tablet by mouth 2 (Two) Times a Day.  Dispense: 60 tablet; Refill: 11  -     propranolol (INDERAL) 20 MG tablet; Take 1 tablet by mouth 2 (Two) Times a Day.  Dispense: 60 tablet; Refill: 11       Assessment & Plan  1. Headaches.  She reports experiencing two severe migraines with vomiting and diarrhea, one in July and one this month. She has been using rizatriptan for her migraines, which works most of the time but occasionally causes dizziness and tiredness. She has stopped taking Excedrin Migraine and noticed an improvement in her milder headaches, although she experienced withdrawal headaches initially. She is currently taking propranolol and topiramate, which have helped reduce the frequency of both severe and mild headaches. Her blood pressure and heart rate are stable but slightly lower than in July. She experiences soreness on the right side of her head, likely due to irritated nerves on the outside of the skull, which can be managed with topiramate. She will continue with her current medications, Topamax 100 mg twice a day and propranolol 20 mg twice a day. Samples of Nurtec will be provided as an alternative abortive medication, which can be taken at the onset of a headache and repeated after 2 hours if needed. If her current medications are not effective, she can contact the office to discuss trying a new medication. The possibility of adding a tricyclic antidepressant to her regimen was discussed, but she prefers to wait and see if her current treatment continues to improve her condition.    2. Medication Management.  A refill for propranolol will be sent to her pharmacy as she is out of it as of this afternoon.    Follow-up  Return in 6 months for follow up.    Consider elavil or cgrp      Patient Education:        Reviewed medications, potential side effects and signs and symptoms to report. Discussed risk versus benefits of treatment plan with patient and/or family-including medications, labs and radiology that may be ordered. Addressed questions and concerns during visit. Patient and/or family verbalized understanding and agree with plan. Instructed to call the office with any questions and report to ER with any life-threatening symptoms.     Follow Up:   Return in about 6 months (around 4/10/2025) for Recheck.    During this visit the following were done:  Labs Reviewed []    Labs Ordered []    Radiology Reports Reviewed []    Radiology Ordered []    PCP Records Reviewed []    Referring Provider Records Reviewed []    ER Records Reviewed []    Hospital Records Reviewed []    History Obtained From Family [x]    Radiology Images Reviewed []    Other Reviewed [x]    Records Requested []      Patient or patient representative verbalized consent for the use of Ambient Listening during the visit with  Kin Garcia DNP, APRN for chart documentation. 10/10/2024  08:20 EDT      Kin Garcia DNP, APRN

## 2024-12-27 DIAGNOSIS — G43.719 INTRACTABLE CHRONIC MIGRAINE WITHOUT AURA AND WITHOUT STATUS MIGRAINOSUS: ICD-10-CM

## 2024-12-27 NOTE — TELEPHONE ENCOUNTER
Spoke with the Pt to let them know the Provider has sent the Rx to the pharmacy to be approved and they can  samples. Pt would the samples mailed out to them. Samples placed up front for .    Pt verbalized understanding.

## 2024-12-27 NOTE — TELEPHONE ENCOUNTER
Caller: Jeanna Martinez    Relationship: Self    Best call back number: [phone]     Requested Prescriptions:   Requested Prescriptions     Pending Prescriptions Disp Refills    rimegepant sulfate (NURTEC) 75 MG tablet 4 tablet 0     Sig: Take 1 tablet by mouth Daily As Needed (HA).        Pharmacy where request should be sent:      Last office visit with prescribing clinician: 10/10/2024   Last telemedicine visit with prescribing clinician: Visit date not found   Next office visit with prescribing clinician: 4/10/2025     Additional details provided by patient: PT STATES SAMPLES EVA GAVE HER HAVE HELPED AND SHE WOULD LIKE RX.    Does the patient have less than a 3 day supply:  [x] Yes  [] No    Would you like a call back once the refill request has been completed: [] Yes [x] No    If the office needs to give you a call back, can they leave a voicemail: [] Yes [x] No    June Chairez Rep   12/27/24 10:17 EST

## 2024-12-30 ENCOUNTER — SPECIALTY PHARMACY (OUTPATIENT)
Dept: ONCOLOGY | Facility: HOSPITAL | Age: 49
End: 2024-12-30
Payer: COMMERCIAL

## 2025-01-05 DIAGNOSIS — G43.719 INTRACTABLE CHRONIC MIGRAINE WITHOUT AURA AND WITHOUT STATUS MIGRAINOSUS: ICD-10-CM

## 2025-01-06 RX ORDER — PROPRANOLOL HCL 20 MG
20 TABLET ORAL 2 TIMES DAILY
Qty: 180 TABLET | OUTPATIENT
Start: 2025-01-06

## 2025-01-06 NOTE — TELEPHONE ENCOUNTER
Rx Refill Note  Requested Prescriptions     Pending Prescriptions Disp Refills    propranolol (INDERAL) 20 MG tablet [Pharmacy Med Name: PROPRANOLOL 20 MG TABLET] 180 tablet      Sig: TAKE 1 TABLET BY MOUTH 2 TIMES A DAY      Last filled: 10/10/24 60 with 11 refills.  Last office visit with prescribing clinician: 10/10/2024      Next office visit with prescribing clinician: 4/10/2025     Too soon to refill.    Claudine Glasgow MA  01/06/25, 11:00 EST

## 2025-01-08 ENCOUNTER — SPECIALTY PHARMACY (OUTPATIENT)
Age: 50
End: 2025-01-08
Payer: COMMERCIAL

## 2025-01-08 DIAGNOSIS — G43.719 INTRACTABLE CHRONIC MIGRAINE WITHOUT AURA AND WITHOUT STATUS MIGRAINOSUS: ICD-10-CM

## 2025-01-08 RX ORDER — PROPRANOLOL HCL 20 MG
20 TABLET ORAL 2 TIMES DAILY
Qty: 180 TABLET | OUTPATIENT
Start: 2025-01-08

## 2025-01-08 RX ORDER — LEVOTHYROXINE SODIUM 100 UG/1
100 TABLET ORAL
COMMUNITY
Start: 2024-12-27

## 2025-01-08 NOTE — PROGRESS NOTES
Specialty Pharmacy Patient Management Program  Neurology Initial Assessment     Jeanna Martinez is a 49 y.o. female with chronic migraines seen by a Neurology provider and enrolled in the Neurology Patient Management program offered by [facility].  An initial outreach was conducted, including assessment of therapy appropriateness and specialty medication education for nurtec 75 mg prn daily. The patient was introduced to services offered by [facility], including: regular assessments, refill coordination, curbside pick-up or mail order delivery options, prior authorization maintenance, and financial assistance programs as applicable. The patient was also provided with contact information for the pharmacy team.     Insurance Coverage & Financial Support  [employer] MIHAELA PA with copay card    Relevant Past Medical History and Comorbidities  Relevant medical history and concomitant health conditions were discussed with the patient. The patient's chart has been reviewed for relevant past medical history and comorbid health conditions and updated as necessary.   Past Medical History:   Diagnosis Date    Migraines      Social History     Socioeconomic History    Marital status:    Tobacco Use    Smoking status: Never    Smokeless tobacco: Never   Vaping Use    Vaping status: Never Used   Substance and Sexual Activity    Alcohol use: Not Currently    Drug use: Never    Sexual activity: Yes     Problem list reviewed by Rodrick Aggarwal, PharmD on 1/8/2025 at  2:33 PM    Allergies  Known allergies and reactions were discussed with the patient. The patient's chart has been reviewed for  allergy information and updated as necessary.   Allergies   Allergen Reactions    Cephalexin Other (See Comments)    Sulfa Antibiotics Other (See Comments)     Allergies reviewed by Rodrick Aggarwal, PharmD on 1/8/2025 at  2:29 PM    Relevant Laboratory Values      Lab  Assessment      Current Medication List  This medication list has been reviewed with the patient and evaluated for any interactions or necessary modifications/recommendations, and updated to include all prescription medications, OTC medications, and supplements the patient is currently taking.  This list reflects what is contained in the patient's profile, which has also been marked as reviewed to communicate to other providers it is the most up to date version of the patient's current medication therapy.     Current Outpatient Medications:     cetirizine (zyrTEC) 5 MG tablet, Take 1 tablet by mouth Daily., Disp: , Rfl:     fluticasone (FLONASE) 50 MCG/ACT nasal spray, Administer 1 spray into the nostril(s) as directed by provider Daily. 2 sprays in each nostril daily. (Patient taking differently: Administer 2 sprays into the nostril(s) as directed by provider Daily. 2 sprays in each nostril daily.), Disp: , Rfl:     levothyroxine (SYNTHROID, LEVOTHROID) 100 MCG tablet, Take 1 tablet by mouth Every Morning., Disp: , Rfl:     LORazepam (ATIVAN) 0.5 MG tablet, Take 1 tablet by mouth As Needed., Disp: , Rfl:     propranolol (INDERAL) 20 MG tablet, Take 1 tablet by mouth 2 (Two) Times a Day., Disp: 60 tablet, Rfl: 11    rimegepant sulfate (Nurtec) 75 MG tablet, Take 1 tablet at the onset of headache, Max of 75 mg/24 hours, Max of 18 tabs/30 days., Disp: 16 tablet, Rfl: 30    rizatriptan MLT (Maxalt-MLT) 10 MG disintegrating tablet, Place 1 tablet on the tongue 1 (One) Time As Needed for Migraine for up to 1 dose. May repeat in 2 hours if needed, Disp: 9 tablet, Rfl: 5    Slynd 4 MG tablet, Take 1 tablet by mouth Daily., Disp: , Rfl:     topiramate (TOPAMAX) 100 MG tablet, Take 1 tablet by mouth 2 (Two) Times a Day., Disp: 60 tablet, Rfl: 11    fexofenadine (Allegra Allergy) 60 MG tablet, Take 1 tablet by mouth 2 (Two) Times a Day. (Patient not taking: Reported on 1/8/2025), Disp: , Rfl:     Rimegepant Sulfate (NURTEC)  75 MG tablet dispersible tablet, Take 1 tablet by mouth Daily As Needed (HA). (Patient not taking: Reported on 1/8/2025), Disp: 4 tablet, Rfl: 0    ubrogepant (Ubrelvy) 100 MG tablet, Take 1 tablet by mouth 1 (One) Time As Needed (ha). (Patient not taking: Reported on 1/8/2025), Disp: 2 tablet, Rfl: 0    Medicines reviewed by Rodrick Aggarwal, PharmD on 1/8/2025 at  2:33 PM    Drug Interactions  No relevant drug drug interactions with specialty medication.       Initial Education Provided for Specialty Medication  The patient has been provided with the following education and any applicable administration techniques (i.e. self-injection) have been demonstrated for the therapies indicated. All questions and concerns have been addressed prior to the patient receiving the medication, and the patient has verbalized understanding of the education and any materials provided.  Additional patient education shall be provided and documented upon request by the patient, provider or payer.      Nurtec (rimegepant)  Medication Expectations   Why am I taking this medication? You are taking this medication for migraine prophylaxis or to treat an acute migraine.   What should I expect while on this medication? You should expect to see a decrease in the frequency and severity of your migraines.   How does the medication work? Nurtec is a monoclonal antibody that binds to calcitonin gene-related peptide (CGRP) and blocks its binding to the receptor decreasing the severity of migraines.   How long will I be on this medication for? The amount of time you will be on this medication will be determined by your doctor and your response to the medication.    How do I take this medication? Take as directed on your prescription label.   What are some possible side effects? Potential side effects including, but not limited to nausea. Pt verbalized understanding.   What happens if I miss a dose? Take the missed dose as soon as possible,  and resume the every other day timed from the last dose..     Medication Safety   What are things I should warn my doctor immediately about? Hypersensitivity reactions - trouble breathing or swallowing.   What are things that I should be cautious of? Hypersensitivity reactions (eg, dyspnea, rash), including delayed serious reactions, have occurred; discontinue use if suspected    What are some medications that can interact with this one? Avoid concomitant administration of Nurtec ODT with strong inhibitors of CY, strong or moderate inducers of CYP3A or inhibitors of P-gp or BCRP. Avoid another dose of Nurtec ODT within 48 hours when it is administered with moderate inhibitors of CY.  Ask your pharmacist or health care provider before starting new medications     Medication Storage/Handling   How should I handle this medication? Keep this medication out of reach of pets/children in original container. Ensure hands are dry before opening blister pack.   How does this medication need to be stored? Store at room temperature away from heat/cold, sunlight or moisture   How should I dispose of this medication? There should not be a need to dispose of this medication unless your provider decides to change the dose or therapy. If that is the case, take to your local police station for proper disposal. Some pharmacies also have take-back bins for medication drop-off.      Resources/Support   How can I remind myself to take this medication? You can download reminder apps to help you manage your refills. You may also set an alarm on your phone to remind you. The pharmacy carries pill boxes that you can place next to an area you pass everyday (such as where you place your car keys or where you charge your phone)   Is financial support available?  Yes, [employer] can provide co-pay cards if you have commercial insurance or patient assistance if you have Medicare or no insurance.    Which vaccines are  recommended for me? Talk to your doctor about these vaccines: Flu, Coronavirus (COVID-19), Pneumococcal (pneumonia), Tdap, Hepatitis B, Zoster (shingles)          Adherence and Self-Administration  Adherence related to the patient's specialty therapy was discussed with the patient. The Adherence segment of this outreach has been reviewed and updated.   Is there a concern with patient's ability to self administer the medication correctly and without issue?: No  Were any potential barriers to adherence identified during the initial assessment or patient education?: No  Are there any concerns regarding the patient's understanding of the importance of medication adherence?: No  Methods for Supporting Patient Adherence and/or Self-Administration: direct education, HA diary and improvement log,     Goals of Therapy  Goals related to the patient's specialty therapy were discussed with the patient. The Patient Goals segment of this outreach has been reviewed and updated.   Goals Addressed Today        Specialty Pharmacy General Goal      On Average, Reduce:   Frequency of migraines to < 8 per month.   Symptom severity by 50 % within 2 hours of taking acute therapy.   Duration of migraines to <  8 hours.     Baseline Values/Notes on Enrollment  Frequency:  2-3 HA/weeks   Symptom Severity: moderate to severe (vomiting, loss of function)  Duration: migraines 12-16 hours up to 2 days.    Date of Reassessment Notes on Progress Toward Above Goals   1/8/2025 1 -2 /week, now head pain, nausea, after using nurtec samples                                            Reassessment Plan & Follow-Up  Medication Therapy Changes: begin nurtec 75 mg po daily prn after completes samples  Related Plans, Therapy Recommendations, or Therapy Problems to Be Addressed: n/a  Pharmacist to perform regular reassessments no more than (6) months from the previous assessment.  Care Coordinator to set up future refill outreaches, coordinate prescription  delivery, and escalate clinical questions to pharmacist.   Welcome information and patient satisfaction survey to be sent by specialty pharmacy team with patient's initial fill.    Attestation  Therapeutic appropriateness: Appropriate   I attest the patient was actively involved in and has agreed to the above plan of care. If the prescribed therapy is at any point deemed not appropriate based on the current or future assessments, a consultation will be initiated with the patient's specialty care provider to determine the best course of action. The revised plan of therapy will be documented along with any additional patient education provided. Discussed aforementioned material with patient via telemedicine.    Rodrick Aggarwal, PharmD, [facility]  Clinic Specialty Pharmacist, Neurology  1/8/2025  15:01 EST

## 2025-01-08 NOTE — TELEPHONE ENCOUNTER
Rx Refill Note  Requested Prescriptions     Pending Prescriptions Disp Refills    propranolol (INDERAL) 20 MG tablet [Pharmacy Med Name: PROPRANOLOL 20 MG TABLET] 180 tablet      Sig: TAKE 1 TABLET BY MOUTH 2 TIMES A DAY      Last filled: 10/10/24 60 with 11 refills.  Last office visit with prescribing clinician: 10/10/2024      Next office visit with prescribing clinician: 4/10/2025     Too soon to refill.    Claudine Glasgow MA  01/08/25, 13:52 EST

## 2025-01-09 NOTE — PROGRESS NOTES
Specialty Pharmacy first fill Note     Jeanna is a 49 y.o. female contacted today regarding refills of her specialty medication(s).    Specialty medication(s) and dose(s) confirmed: nurtec  Changes to medications: no  Changes to insurance: no  Reviewed and verified with patient:  Allergies  Meds  Problems             Delivery Questions      Flowsheet Row Most Recent Value   Delivery method UPS   Delivery address verified with patient/caregiver? Yes   Delivery address Home   Number of medications in delivery 1   Medication(s) being filled and delivered Rimegepant Sulfate (NURTEC)   Doses left of specialty medications couple samples remain   Copay verified? Yes   Copay amount 0$   Copay form of payment No copayment ($0)   Ship Date 1/9   Delivery Date 1/10   Signature Required No                 Follow-up: 4 week(s)     Rodrick Aggarwal, PharmD  1/9/2025   09:48 EST

## 2025-03-31 ENCOUNTER — TELEPHONE (OUTPATIENT)
Dept: NEUROLOGY | Facility: CLINIC | Age: 50
End: 2025-03-31
Payer: COMMERCIAL

## 2025-05-21 ENCOUNTER — SPECIALTY PHARMACY (OUTPATIENT)
Dept: ONCOLOGY | Facility: HOSPITAL | Age: 50
End: 2025-05-21
Payer: COMMERCIAL

## 2025-05-21 NOTE — PROGRESS NOTES
Specialty Pharmacy Patient Management Program  Refill Outreach     Jeanna was contacted today regarding refills of their medication(s).    Refill Questions      Flowsheet Row Most Recent Value   Changes to allergies? No   Changes to medications? No   New conditions or infections since last clinic visit No   Unplanned office visit, urgent care, ED, or hospital admission in the last 4 weeks  No   How does patient/caregiver feel medication is working? Good   Financial problems or insurance changes  No   Since the previous refill, were any specialty medication doses or scheduled injections missed or delayed?  No   Does this patient require a clinical escalation to a pharmacist? No            Delivery Questions      Flowsheet Row Most Recent Value   Delivery method UPS   Delivery address verified with patient/caregiver? Yes   Delivery address Home   Other address preferred n/a   Number of medications in delivery 1   Medication(s) being filled and delivered Rimegepant Sulfate (NURTEC-ODT)   Doses left of specialty medications 5 Nurtec tabs left   Copay verified? Yes   Copay amount no copay   Copay form of payment No copayment ($0)   Delivery Date Selection 05/22/25   Signature Required No                 Follow-up: 30 day(s)     Gustavo Valdez, Pharmacy Technician  5/21/2025  11:21 EDT

## 2025-06-17 RX ORDER — LEVOTHYROXINE SODIUM 88 UG/1
100 TABLET ORAL
COMMUNITY

## 2025-06-17 RX ORDER — NORETHINDRONE ACETATE AND ETHINYL ESTRADIOL AND FERROUS FUMARATE 1MG-20(21)
1 KIT ORAL DAILY
COMMUNITY
Start: 2025-03-12

## 2025-06-17 RX ORDER — CELECOXIB 200 MG/1
200 CAPSULE ORAL
COMMUNITY
End: 2025-06-19

## 2025-06-17 RX ORDER — TOPIRAMATE 100 MG/1
100 TABLET, FILM COATED ORAL 2 TIMES DAILY
COMMUNITY
End: 2025-06-19 | Stop reason: SDUPTHER

## 2025-06-18 NOTE — PROGRESS NOTES
Neuro Office Visit      Encounter Date: 2025   Patient Name: Jeanna Martinez  : 1975   MRN: [id number]   PCP: Jasvir Gonzalez  Chief Complaint:    Chief Complaint   Patient presents with    Migraine       History of Present Illness: Jeanna Martinez is a 49 y.o. female who is here today in Neurology for  migraine    Last visit 10/10/2024-nutrec and ubrelvy samples, TPM, propranolol  History of Present Illness    Headaches are improved only having 3-6 migrianes a month. Feels tired in am even w good sleep.             Migraines   Current rx: maxlat, TPM, propranolol  Headache Symptoms:   Days per month: daily 12 are severe.  Location: Right Eye, Left Eye, and band distribution. Uses conterpressure and coolness      Quality: Sharp and Throbbing        Duration: 2 days  Severity: mild 3/10. Migraine. 7/10  Triggers: menstrual cycles, stress.  Disrupted sleep, skipped meals and weather changes  Associated Symptoms: Nausea, Vomiting, Photophobia, Phonophobia, and  Vision changes blurred.  Aura: none  Hydration: 64 oz  Sleep: 7 hours  Last eye exam: 18 months ago  Birth control: now on Dary  Snores at night: this is not new.        Abortives: excedrin migraine 4/week, rizatriptan  Preventives: TPM, propranolol        IMAGING SCANNED (2018)-MRI brain-nml        The patient has been experiencing persistent migraines since the age of 14 or 15, which have been progressively worsening. She experiences approximately 12 migraines per month, in addition to daily headaches, which do not interfere with her daily activities. Her mild headaches, which intensify into a migraine, typically lasting for 2 days. The severity of her severe headaches is described as sharp and throbbing, localized around the eye sockets, and around her head in a band distribution.  Her mild headaches, which she rates as 3 out of 10, are not severe enough to prevent her from working. A few years ago, she received an injection in the  emergency room.      Her migraines are triggered by menstrual cycle, allergies, tension, and stress. She was prescribed Slynd by Dr. Gonzalez, which has not provided relief. She maintains adequate hydration, including water, 1 to 2 cups of coffee, and tea. She does not smoke or drink alcohol. Her migraines are triggered by skipping meals, dehydration, and weather changes.      She experiences nausea, vomiting, photophobia, and phonophobia with her migraines. She denies experiencing dizziness or tinnitus. She reports vision changes during her migraines, including blurry vision and difficulty focusing. Her last eye exam was approximately 18 months ago. S     She has tried Topamax. Her migraines have increased in frequency since 2018. She snores at night, but has not been evaluated for sleep apnea. She denies a history of kidney stones. She experienced tingling sensations when on topiramate, which resolved after increasing her water intake. She denies experiencing brain fog. She is nervous about injections due to potential side effects.                                                                    PMH: hypothyroidism, insomnia, environmental allergies. No renal stones. CTS using stand up desk with good results. Wore splints for a time.  FH: mother, dtr, mother w brain aneurysm  SH: , works as state employee, 2 kids, 2 cats and dogs. -tob, -etoh, -drugs.      Subjective      Past Medical History:   Past Medical History:   Diagnosis Date    Headache, tension-type     Migraines        Past Surgical History:   Past Surgical History:   Procedure Laterality Date     SECTION         Family History:   Family History   Problem Relation Age of Onset    Aneurysm Mother     Migraine headaches Mother     Migraines Mother     Cancer Father     Diabetes Maternal Grandmother     Aneurysm Maternal Aunt     Aneurysm Paternal Aunt     Migraine headaches Daughter        Social History:   Social History      Socioeconomic History    Marital status:    Tobacco Use    Smoking status: Never     Passive exposure: Never    Smokeless tobacco: Never   Vaping Use    Vaping status: Never Used   Substance and Sexual Activity    Alcohol use: Not Currently    Drug use: Never    Sexual activity: Yes     Birth control/protection: Birth control pill       Medications:     Current Outpatient Medications:     cetirizine (zyrTEC) 5 MG tablet, Take 1 tablet by mouth Daily., Disp: , Rfl:     fluticasone (FLONASE) 50 MCG/ACT nasal spray, Administer 1 spray into the nostril(s) as directed by provider Daily. 2 sprays in each nostril daily. (Patient taking differently: Administer 2 sprays into the nostril(s) as directed by provider Daily. 2 sprays in each nostril daily.), Disp: , Rfl:     Dary FE 1/20 1-20 MG-MCG per tablet, Take 1 tablet by mouth Daily., Disp: , Rfl:     levothyroxine (SYNTHROID, LEVOTHROID) 88 MCG tablet, Take 100 mcg by mouth Every Morning., Disp: , Rfl:     LORazepam (ATIVAN) 0.5 MG tablet, Take 1 tablet by mouth As Needed., Disp: , Rfl:     propranolol (INDERAL) 20 MG tablet, Take 1/2 in am and 1 pm, Disp: 45 tablet, Rfl: 11    rimegepant sulfate ODT (Nurtec) 75 MG disintegrating tablet, Take 1 tablet at the onset of headache, Max of 75 mg/24 hours, Max of 18 tabs/30 days., Disp: 16 tablet, Rfl: 30    rizatriptan MLT (Maxalt-MLT) 10 MG disintegrating tablet, Place 1 tablet on the tongue 1 (One) Time As Needed for Migraine for up to 1 dose. May repeat in 2 hours if needed, Disp: 9 tablet, Rfl: 11    topiramate (TOPAMAX) 100 MG tablet, Take 1 tablet by mouth 2 (Two) Times a Day., Disp: 60 tablet, Rfl: 11    Allergies:   Allergies   Allergen Reactions    Cephalexin Other (See Comments)    Sulfa Antibiotics Other (See Comments)       PHQ-9 Total Score:     STEADI Fall Risk Assessment has not been completed.    Objective     Physical Exam:   Physical Exam  Eyes:      Extraocular Movements: No nystagmus.  "  Neurological:      Motor: Motor strength is normal.     Coordination: Coordination is intact.      Deep Tendon Reflexes:      Reflex Scores:       Bicep reflexes are 2+ on the right side and 2+ on the left side.       Brachioradialis reflexes are 2+ on the right side and 2+ on the left side.       Patellar reflexes are 2+ on the right side and 2+ on the left side.       Achilles reflexes are 2+ on the right side and 2+ on the left side.  Psychiatric:         Speech: Speech normal.         Neurological Exam  Mental Status  Awake, alert and oriented to person, place and time. Recent and remote memory are intact. Speech is normal. Follows complex commands. Attention and concentration are normal. Fund of knowledge is appropriate for level of education.    Cranial Nerves  CN III, IV, VI: No nystagmus. Normal saccades. Normal smooth pursuit.   Right pupil: Round.   Left pupil: Round.  CN V: Facial sensation is normal.  CN VII: Full and symmetric facial movement.    Motor  Normal muscle bulk throughout. No fasciculations present. Normal muscle tone. No abnormal involuntary movements. Strength is 5/5 throughout all four extremities.    Sensory  Sensation is intact to light touch, pinprick, vibration and proprioception in all four extremities.    Reflexes                                            Right                      Left  Brachioradialis                    2+                         2+  Biceps                                 2+                         2+  Patellar                                2+                         2+  Achilles                                2+                         2+    Coordination    Finger-to-nose, rapid alternating movements and heel-to-shin normal bilaterally without dysmetria.    Gait  Normal casual, toe, heel and tandem gait.     Physical Exam        Vital Signs:   Vitals:    06/19/25 1533   BP: 108/80   Pulse: 57   SpO2: 98%   Weight: 65.3 kg (144 lb)   Height: 157.5 cm (62.01\") "     Body mass index is 26.33 kg/m².         Assessment / Plan      Assessment/Plan:   Diagnoses and all orders for this visit:    1. Intractable chronic migraine without aura and without status migrainosus  Comments:  Cont tpm 100 bid, decrease propranolol 20 1/2 in a and 1 pm, cont nurtec and maxalt  Orders:  -     propranolol (INDERAL) 20 MG tablet; Take 1/2 in am and 1 pm  Dispense: 45 tablet; Refill: 11  -     topiramate (TOPAMAX) 100 MG tablet; Take 1 tablet by mouth 2 (Two) Times a Day.  Dispense: 60 tablet; Refill: 11  -     rizatriptan MLT (Maxalt-MLT) 10 MG disintegrating tablet; Place 1 tablet on the tongue 1 (One) Time As Needed for Migraine for up to 1 dose. May repeat in 2 hours if needed  Dispense: 9 tablet; Refill: 11       Assessment & Plan          Patient Education:       Reviewed medications, potential side effects and signs and symptoms to report. Discussed risk versus benefits of treatment plan with patient and/or family-including medications, labs and radiology that may be ordered. Addressed questions and concerns during visit. Patient and/or family verbalized understanding and agree with plan. Instructed to call the office with any questions and report to ER with any life-threatening symptoms.     Follow Up:   Return in about 6 months (around 12/19/2025) for Recheck.    During this visit the following were done:  Labs Reviewed []    Labs Ordered []    Radiology Reports Reviewed []    Radiology Ordered []    PCP Records Reviewed []    Referring Provider Records Reviewed []    ER Records Reviewed []    Hospital Records Reviewed []    History Obtained From Family []    Radiology Images Reviewed []    Other Reviewed [x]    Records Requested []      Patient or patient representative verbalized consent for the use of Ambient Listening during the visit with  Kin Garcia DNP, APRZOILA for chart documentation. 6/19/2025  17:40 EDT      Kin Garcia DNP, APRN

## 2025-06-19 ENCOUNTER — OFFICE VISIT (OUTPATIENT)
Dept: NEUROLOGY | Facility: CLINIC | Age: 50
End: 2025-06-19
Payer: COMMERCIAL

## 2025-06-19 VITALS
HEART RATE: 57 BPM | WEIGHT: 144 LBS | SYSTOLIC BLOOD PRESSURE: 108 MMHG | HEIGHT: 62 IN | BODY MASS INDEX: 26.5 KG/M2 | OXYGEN SATURATION: 98 % | DIASTOLIC BLOOD PRESSURE: 80 MMHG

## 2025-06-19 DIAGNOSIS — G43.719 INTRACTABLE CHRONIC MIGRAINE WITHOUT AURA AND WITHOUT STATUS MIGRAINOSUS: ICD-10-CM

## 2025-06-19 PROCEDURE — 99213 OFFICE O/P EST LOW 20 MIN: CPT | Performed by: NURSE PRACTITIONER

## 2025-06-19 RX ORDER — PROPRANOLOL HCL 20 MG
TABLET ORAL
Qty: 45 TABLET | Refills: 11 | Status: SHIPPED | OUTPATIENT
Start: 2025-06-19

## 2025-06-19 RX ORDER — TOPIRAMATE 100 MG/1
100 TABLET, FILM COATED ORAL 2 TIMES DAILY
Qty: 60 TABLET | Refills: 11 | Status: SHIPPED | OUTPATIENT
Start: 2025-06-19

## 2025-06-19 RX ORDER — RIZATRIPTAN BENZOATE 10 MG/1
10 TABLET, ORALLY DISINTEGRATING ORAL ONCE AS NEEDED
Qty: 9 TABLET | Refills: 11 | Status: SHIPPED | OUTPATIENT
Start: 2025-06-19

## 2025-06-20 ENCOUNTER — SPECIALTY PHARMACY (OUTPATIENT)
Dept: ONCOLOGY | Facility: HOSPITAL | Age: 50
End: 2025-06-20
Payer: COMMERCIAL

## 2025-06-20 NOTE — PROGRESS NOTES
Specialty Pharmacy Patient Management Program  Neurology Reassessment     Jeanna Martinez is a 49 y.o. female with migraines seen by a Neurology provider and enrolled in the Neurology Patient Management program offered by [facility].  A follow-up outreach was conducted, including assessment of continued therapy appropriateness, medication adherence, and side effect incidence and management for nurtec 75 mg SL po prn.     Changes to Insurance Coverage or Financial Support  No changes     Relevant Past Medical History and Comorbidities  Relevant medical history and concomitant health conditions were discussed with the patient. The patient's chart has been reviewed for relevant past medical history and comorbid health conditions and updated as necessary.   Past Medical History:   Diagnosis Date    Headache, tension-type     Migraines      Social History     Socioeconomic History    Marital status:    Tobacco Use    Smoking status: Never     Passive exposure: Never    Smokeless tobacco: Never   Vaping Use    Vaping status: Never Used   Substance and Sexual Activity    Alcohol use: Not Currently    Drug use: Never    Sexual activity: Yes     Birth control/protection: Birth control pill     Problem list reviewed by Rodrick Aggarwal, PharmD on 6/20/2025 at  3:35 PM    Hospitalizations and Urgent Care Since Last Assessment  ED Visits, Admissions, or Hospitalizations: none   Urgent Office Visits: noen     Allergies  Known allergies and reactions were discussed with the patient. The patient's chart has been reviewed for allergy information and updated as necessary.   Allergies   Allergen Reactions    Cephalexin Other (See Comments)    Sulfa Antibiotics Other (See Comments)     Allergies reviewed by Rodrick Aggarwal, PharmD on 6/20/2025 at  3:35 PM    Relevant Laboratory Values      Lab Assessment        Current Medication List  This medication list has been reviewed with the patient  and evaluated for any interactions or necessary modifications/recommendations, and updated to include all prescription medications, OTC medications, and supplements the patient is currently taking.  This list reflects what is contained in the patient's profile, which has also been marked as reviewed to communicate to other providers it is the most up to date version of the patient's current medication therapy.     Current Outpatient Medications:     cetirizine (zyrTEC) 5 MG tablet, Take 1 tablet by mouth Daily., Disp: , Rfl:     fluticasone (FLONASE) 50 MCG/ACT nasal spray, Administer 1 spray into the nostril(s) as directed by provider Daily. 2 sprays in each nostril daily. (Patient taking differently: Administer 2 sprays into the nostril(s) as directed by provider Daily. 2 sprays in each nostril daily.), Disp: , Rfl:     Dary FE 1/20 1-20 MG-MCG per tablet, Take 1 tablet by mouth Daily., Disp: , Rfl:     levothyroxine (SYNTHROID, LEVOTHROID) 88 MCG tablet, Take 100 mcg by mouth Every Morning., Disp: , Rfl:     LORazepam (ATIVAN) 0.5 MG tablet, Take 1 tablet by mouth As Needed., Disp: , Rfl:     propranolol (INDERAL) 20 MG tablet, Take 1/2 in am and 1 pm, Disp: 45 tablet, Rfl: 11    rimegepant sulfate ODT (Nurtec) 75 MG disintegrating tablet, Take 1 tablet at the onset of headache, Max of 75 mg/24 hours, Max of 18 tabs/30 days., Disp: 16 tablet, Rfl: 30    rizatriptan MLT (Maxalt-MLT) 10 MG disintegrating tablet, Place 1 tablet on the tongue 1 (One) Time As Needed for Migraine for up to 1 dose. May repeat in 2 hours if needed, Disp: 9 tablet, Rfl: 11    topiramate (TOPAMAX) 100 MG tablet, Take 1 tablet by mouth 2 (Two) Times a Day., Disp: 60 tablet, Rfl: 11    Medicines reviewed by Rodrick Aggarwal, PharmD on 6/20/2025 at  3:35 PM    Drug Interactions  No relevant drug drug interactions with specialty medication.       Adverse Drug Reactions  Medication tolerability: Tolerating with no to minimal ADRs  (reports D with nurtec)          Medication plan: Continue therapy with normal follow-up  Plan for ADR Management: patient to report      Adherence, Self-Administration, and Current Therapy Problems  Adherence related patient's specialty therapy was discussed with the patient. The Adherence segment of this outreach has been reviewed and updated.   Adherence Questions  Linked Medication(s) Assessed: Rimegepant Sulfate (NURTEC-ODT)  On average, how many doses/injections does the patient miss per month?: 0  What are the identified reasons for non-adherence or missed doses? : no problems identified  What is the estimated medication adherence level?: % (Nurtec use prn)  Based on the patient/caregiver response and refill history, does this patient require an MTP to track adherence improvements?: no    Additional Barriers to Patient Self-Administration: none  Methods for Supporting Patient Self-Administration: n/a    Recently Close Medication Therapy Problems  No medication therapy recommendations to display  Open Medication Therapy Problems  No medication therapy recommendations to display     Goals of Therapy  Goals related to the patient's specialty therapy was discussed with the patient. The Patient Goals segment of this outreach has been reviewed and updated.    Goals Addressed Today        Specialty Pharmacy General Goal      On Average, Reduce:   Frequency of migraines to < 8 per month.   Symptom severity by 50 % within 2 hours of taking acute therapy.   Duration of migraines to <  8 hours.     Baseline Values/Notes on Enrollment  Frequency:  2-3 HA/weeks   Symptom Severity: moderate to severe (vomiting, loss of function)  Duration: migraines 12-16 hours up to 2 days.    Date of Reassessment Notes on Progress Toward Above Goals   1/8/2025 1 -2 /week, now head pain, nausea, after using nurtec samples   6/19/2025 3-6/month, uses rizatriptan during week and nurtec on weekends.                                         Quality of Life Assessment   Quality of Life related to the patient's enrollment in the patient management program and services provided was discussed with the patient. The QOL segment of this outreach has been reviewed and updated.   Quality of Life Improvement Scale: 8-Moderately better    Reassessment Plan & Follow-Up  Medication Therapy Changes: n/a  Related Plans, Therapy Recommendations, or Issues to Be Addressed: n/a  Pharmacist to perform regular reassessments no more than (6) months from the previous assessment.  Care Coordinator to set up future refill outreaches, coordinate prescription delivery, and escalate clinical questions to pharmacist.     Attestation  Therapeutic appropriateness: Appropriate  I attest the patient was actively involved in and has agreed to the above plan of care. If the prescribed therapy is at any point deemed not appropriate based on the current or future assessments, a consultation will be initiated with the patient's specialty care provider to determine the best course of action. The revised plan of therapy will be documented along with any additional patient education provided. Discussed aforementioned material with patient in person.    Rodrick Aggarwal, [place], [facility]  Clinic Specialty Pharmacist, Neurology  6/23/2025  08:49 EDT